# Patient Record
Sex: MALE | Race: BLACK OR AFRICAN AMERICAN | NOT HISPANIC OR LATINO | Employment: OTHER | URBAN - NONMETROPOLITAN AREA
[De-identification: names, ages, dates, MRNs, and addresses within clinical notes are randomized per-mention and may not be internally consistent; named-entity substitution may affect disease eponyms.]

---

## 2023-03-20 ENCOUNTER — TRANSCRIBE ORDERS (OUTPATIENT)
Dept: PHYSICAL THERAPY | Facility: HOSPITAL | Age: 53
End: 2023-03-20
Payer: MEDICARE

## 2023-03-20 DIAGNOSIS — Z98.1 ARTHRODESIS STATUS: Primary | ICD-10-CM

## 2023-03-22 ENCOUNTER — HOSPITAL ENCOUNTER (OUTPATIENT)
Dept: PHYSICAL THERAPY | Facility: HOSPITAL | Age: 53
Setting detail: THERAPIES SERIES
Discharge: HOME | End: 2023-03-22
Payer: MEDICARE

## 2023-03-22 DIAGNOSIS — M54.50 CHRONIC LOW BACK PAIN, UNSPECIFIED BACK PAIN LATERALITY, UNSPECIFIED WHETHER SCIATICA PRESENT: ICD-10-CM

## 2023-03-22 DIAGNOSIS — G89.29 CHRONIC LOW BACK PAIN, UNSPECIFIED BACK PAIN LATERALITY, UNSPECIFIED WHETHER SCIATICA PRESENT: ICD-10-CM

## 2023-03-22 DIAGNOSIS — Z98.1 ARTHRODESIS STATUS: Primary | ICD-10-CM

## 2023-03-22 PROCEDURE — 97162 PT EVAL MOD COMPLEX 30 MIN: CPT | Performed by: PHYSICAL THERAPIST

## 2023-03-22 NOTE — THERAPY EVALUATION
Outpatient Physical Therapy Ortho Initial Evaluation  [facility]     Patient Name: Sukumar Reina  : 1970  MRN: [id number]  Today's Date: 3/22/2023      Visit Date: 2023    Patient seen for 1 PT sessions.  Patient reports N/A% of improvement.  Next MD appt: NIKKI.  Recertification: 2023.    Therapy Diagnosis: S/P L5/S1 Fusion         Past Medical History:   Diagnosis Date   • Anxiety    • Arthritis    • Cancer (HCC)     Prostate, surgery, no chemo or radiation   • Depression    • Diabetes mellitus (HCC)    • Neuropathy         Past Surgical History:   Procedure Laterality Date   • BACK SURGERY     • HERNIA REPAIR     • JOINT REPLACEMENT         Visit Dx:     ICD-10-CM ICD-9-CM   1. Arthrodesis status  Z98.1 V45.4   2. Chronic low back pain, unspecified back pain laterality, unspecified whether sciatica present  M54.50 724.2    G89.29 338.29          Patient History     Row Name 23 0900             History    Chief Complaint Pain  -AJ      Type of Pain Back pain  -AJ      Date Current Problem(s) Began --  Chronicm, years  -AJ      Brief Description of Current Complaint Patient reprots he retired from the [employer] in  and that was his first back surgery. He reprots he has had 5 total back surgeries and is now fused in his entire lumbar spine. He reprots he also has a R TKA, L YOHANA and L TSA. He reprots he is set to have his other hip and knee replaced. He reprots all his doctors are in [place].  -AJ      Previous treatment for THIS PROBLEM Injections;Rehabilitation;Medication;Surgery  -AJ      Patient/Caregiver Goals Relieve pain;Improve mobility  -AJ      Current Tobacco Use None  -AJ      Smoking Status Non-smoking  -AJ      Patient's Rating of General Health Fair  -AJ      Occupation/sports/leisure activities Occupation: retired; Hobbies: fishing  -AJ      What clinical tests have you had for this problem? X-ray  -AJ         Pain     Pain Location Back  -AJ      Pain at  Present 7  -AJ      Pain at Best 4  -AJ      Pain at Worst 10  -AJ      Pain Frequency Constant/continuous  -AJ      Pain Description Sharp;Aching;Numbness  -AJ      What Performance Factors Make the Current Problem(s) WORSE? Prolonged positions, lifting  -AJ      What Performance Factors Make the Current Problem(s) BETTER? meds, change in position  -AJ      Is your sleep disturbed? Yes  -AJ      Is medication used to assist with sleep? Yes  -AJ      Total hours of sleep per night 2 hour segments  -AJ            User Key  (r) = Recorded By, (t) = Taken By, (c) = Cosigned By    Initials Name Provider Type    AJ Alina Kiran, PT DPT Physical Therapist                 PT Ortho     Row Name 03/22/23 0900       Subjective Comments    Subjective Comments see history  -AJ       Precautions and Contraindications    Precautions SPINAL FUSION  -    Contraindications NO BEND, TWIST, LIFT >10#  -AJ       Subjective Pain    Able to rate subjective pain? yes  -AJ    Pre-Treatment Pain Level 7  -AJ    Post-Treatment Pain Level 7  -AJ       Posture/Observations    Alignment Options Forward head;Thoracic kyphosis;Lumbar lordosis;Iliac crests  -AJ    Forward Head Mild;Moderate;Increased  -AJ    Thoracic Kyphosis Mild;Increased  -AJ    Lumbar lordosis Mild;Decreased  flattening  -    Iliac crests Bilateral:;Normal  pelvis appears level, does have true LLD which patient was fit with heel lift to assist with.  -AJ    Posture/Observations Comments No distress, poor overall postural awareness.  -AJ       Lumbar/SI Special Tests    Trendelenburg Test (Gluteus Medius Weakness) Bilateral:;Negative  -AJ    SLR (Neural Tension) Bilateral:;Positive  -AJ    AMBER (hip vs. SI Dysfunction) Bilateral:;Positive  -AJ       Lumbosacral Palpation    SI Bilateral:;Tender  -AJ    Lumbosacral Segment Bilateral:;Tender  -AJ       General ROM    GENERAL ROM COMMENTS AROM deferreed due to post op status  -AJ       MMT (Manual Muscle Testing)     General MMT Comments B LE 4-/5 with poor overall effort with cogwheeling.  -AJ       Sensation    Sensation WNL? WFL  -AJ    Light Touch No apparent deficits  -       Lower Extremity Flexibility    Hamstrings Bilateral:;Moderately limited  90-90 angles R:42°, L 46°  -AJ    LE Other Flexibility Bilateral:;Moderately limited  -AJ       Transfers    Comment, (Transfers) I with al ltransfers, absent log roll technique and significant effort with sit to/from stand.  -AJ       Gait/Stairs (Locomotion)    Comment, (Gait/Stairs) FWB, non-antalgic gait, no assistive device, no significant deviations noted, normal arm swing with gait.  -AJ          User Key  (r) = Recorded By, (t) = Taken By, (c) = Cosigned By    Initials Name Provider Type    Alina Beverly, PT DPT Physical Therapist                            Therapy Education  Education Details: HEP: all exercises given today  Given: HEP, Symptoms/condition management, Pain management, Posture/body mechanics, Other (comment) (POC)  Program: New  How Provided: Verbal, Demonstration, Written  Provided to: Patient  Level of Understanding: Verbalized, Demonstrated, Teach back education performed      PT OP Goals     Row Name 03/22/23 0900          PT Short Term Goals    STG 1 I with HEP and have additions/changes by next recertification  -     STG 2 Patient able to show proper log roll technique.  -     STG 3 Patient to be more aware of posture and posture correction techniques  -     STG 4 Patientot have improved hamstring flexibility with popliteal angles <= 30° in supine 90-90 position.  -     STG 5 R knee 0° extension.  -     STG 6 Patient to be complient with heel wedge wear for LLD.  -     STG 7 B LE >= 4/5.  -        Long Term Goals    LTG 1 B LE >= 4+/5  -     LTG 2 Patient able to perform 10 minutes of standing core stab activities with good PN and no increase in pain.  -     LTG 3 Patient able to perform 10 minutes of seated DD core stab  "activities with good PN and no increase in pain.  -     LTG 4 Patient able to perform 20 Bridges with UE \"X\" with no increase in pain.  -     LTG 5 Patient able to show proper lifting technique floor to waist with no increase in pain.  -     LTG 6 Patient able to show proper ergonomics for mopping/sweeping/vacuuming.  -     LTG 7 Patient able to complete 3 reps of 30 seconds wall sits with Pball behind back for improved LE and core endurance and strength.  -     LTG 8 I with final HEP.  -        Time Calculation    PT Goal Re-Cert Due Date 04/12/23  -           User Key  (r) = Recorded By, (t) = Taken By, (c) = Cosigned By    Initials Name Provider Type    Alina Beverly, PT DPT Physical Therapist              Barriers to Rehab: Include significant or possible arthritic/degenerative changes that have occurred within the spine, The chronicity of this issue, The patient's generally deconditioned state.    Safety Issues: No bending/twisting/lifting >10#       PT Assessment/Plan     Row Name 03/22/23 0900          PT Assessment    Functional Limitations Impaired gait;Limitation in home management;Limitations in community activities;Performance in leisure activities;Performance in self-care ADL  -     Impairments Endurance;Gait;Impaired flexibility;Impaired muscle endurance;Impaired muscle length;Impaired muscle power;Impaired postural alignment;Joint integrity;Muscle strength;Pain;Posture;Range of motion;Poor body mechanics  -     Assessment Comments Patientis a 52yo male who presents to the clinic today s/p L2/S1 fusion and a history of other lumbar fusions. he has restrictions of no bending, twisting, or lifting >10#. He has sgnificant core weakness, LE weakness, and impaired flexibility. Patient's insurance does not allow treatment to begin on the initial evaluation. Small HEP was established.    Skilled PT with address deficits listed.  -AJ     Please refer to paper survey for additional " "self-reported information Yes  -AJ     Rehab Potential Fair  -AJ     Patient/caregiver participated in establishment of treatment plan and goals Yes  -AJ     Patient would benefit from skilled therapy intervention Yes  -AJ        PT Plan    PT Frequency 2x/week  -AJ     Predicted Duration of Therapy Intervention (PT) 8-12 visits  -AJ     Planned CPT's? PT EVAL MOD COMPLELITY: 41610;PT RE-EVAL: 59346;PT THER PROC EA 15 MIN: 79723;PT THER ACT EA 15 MIN: 37415;PT NEUROMUSC RE-EDUCATION EA 15 MIN: 32475;PT GAIT TRAINING EA 15 MIN: 90449;PT SELF CARE/HOME MGMT/TRAIN EA 15: 43941;PT THER SUPP EA 15 MIN  -AJ     PT Plan Comments progress overall core stab, posture, flexibility, ergonomics, and spinal protection.  -AJ           User Key  (r) = Recorded By, (t) = Taken By, (c) = Cosigned By    Initials Name Provider Type    Alina Beverly, PT DPT Physical Therapist            Other therapeutic activities and/or exercises will be prescribed depending on the patient's progress or lack thereof.         OP Exercises     Row Name 03/22/23 0900             Subjective Comments    Subjective Comments see history  -AJ         Subjective Pain    Able to rate subjective pain? yes  -AJ      Pre-Treatment Pain Level 7  -AJ      Post-Treatment Pain Level 7  -AJ         Exercise 1    Exercise Name 1 FIt with heel lift for LLD  -AJ      Additional Comments Sz Medium  -AJ         Exercise 2    Exercise Name 2 B sitting piriformis S  -AJ      Reps 2 1  -AJ      Time 2 30 seconds  -AJ      Additional Comments oress knee down  -AJ         Exercise 3    Exercise Name 3 B St. HS S  -AJ      Reps 3 1  -AJ      Time 3 30 seconds  -AJ         Exercise 4    Exercise Name 4 HLM  -AJ      Sets 4 1  -AJ      Reps 4 10 each LE  -AJ      Time 4 5\" hold  -AJ            User Key  (r) = Recorded By, (t) = Taken By, (c) = Cosigned By    Initials Name Provider Type    Ailna Beverly, PT DPT Physical Therapist            All therapeutic " interventions performed today were to address current functional limitations and/or deficits in addressing all physical therapy goals.                    Outcome Measure Options: Modified Oswestry  Modified Oswestry  Modified Oswestry Score/Comments: 35/50 = 70%      Time Calculation:     Start Time: 0905  Stop Time: 0954  Time Calculation (min): 49 min     Therapy Charges for Today     Code Description Service Date Service Provider Modifiers Qty    74183882584 HC PT THER SUPP EA 15 MIN 3/22/2023 Alina Kiran, PT DPT GP 2    48272616753  PT EVAL MOD COMPLEXITY 3 3/22/2023 Alina Kiran, PT THERONT GP 1          PT G-Codes  Outcome Measure Options: Modified Oswestry  Modified Oswestry Score/Comments: 35/50 = 70%       This document has been electronically signed by Alina Kiran PT DPT, [facility] on March 22, 2023 16:14 CDT

## 2023-03-23 ENCOUNTER — APPOINTMENT (OUTPATIENT)
Dept: PHYSICAL THERAPY | Facility: HOSPITAL | Age: 53
End: 2023-03-23
Payer: MEDICARE

## 2023-03-28 ENCOUNTER — HOSPITAL ENCOUNTER (OUTPATIENT)
Dept: PHYSICAL THERAPY | Facility: HOSPITAL | Age: 53
Setting detail: THERAPIES SERIES
Discharge: HOME | End: 2023-03-28
Payer: MEDICARE

## 2023-03-28 DIAGNOSIS — M54.50 CHRONIC LOW BACK PAIN, UNSPECIFIED BACK PAIN LATERALITY, UNSPECIFIED WHETHER SCIATICA PRESENT: ICD-10-CM

## 2023-03-28 DIAGNOSIS — Z98.1 ARTHRODESIS STATUS: Primary | ICD-10-CM

## 2023-03-28 DIAGNOSIS — G89.29 CHRONIC LOW BACK PAIN, UNSPECIFIED BACK PAIN LATERALITY, UNSPECIFIED WHETHER SCIATICA PRESENT: ICD-10-CM

## 2023-03-28 PROCEDURE — 97110 THERAPEUTIC EXERCISES: CPT | Performed by: PHYSICAL THERAPIST

## 2023-03-28 NOTE — THERAPY TREATMENT NOTE
Outpatient Physical Therapy Ortho Treatment Note  [facility]     Patient Name: Sukumar Reina  : 1970  MRN: [id number]  Today's Date: 3/28/2023      Visit Date: 2023     Patient seen for 2 PT sessions.  Patient reports N/A% of improvement.  Next MD appt: NIKKI.  Recertification: 2023.     Therapy Diagnosis: S/P L5/S1 Fusion    Visit Dx:    ICD-10-CM ICD-9-CM   1. Arthrodesis status  Z98.1 V45.4   2. Chronic low back pain, unspecified back pain laterality, unspecified whether sciatica present  M54.50 724.2    G89.29 338.29       There is no problem list on file for this patient.       Past Medical History:   Diagnosis Date   • Anxiety    • Arthritis    • Cancer (HCC)     Prostate, surgery, no chemo or radiation   • Depression    • Diabetes mellitus ([facility])    • Neuropathy         Past Surgical History:   Procedure Laterality Date   • BACK SURGERY     • HERNIA REPAIR     • JOINT REPLACEMENT          PT Ortho     Row Name 23 0800       Subjective Comments    Subjective Comments Patient reports he didn't take his meds this morning because he didn't eat breakfast.  -       Precautions and Contraindications    Precautions SPINAL FUSION  -    Contraindications NO BEND, TWIST, LIFT >10#  -       Subjective Pain    Able to rate subjective pain? yes  -    Pre-Treatment Pain Level 8  -          User Key  (r) = Recorded By, (t) = Taken By, (c) = Cosigned By    Initials Name Provider Type    Alina Beverly, PT DPT Physical Therapist                             PT Assessment/Plan     Row Name 23 0800          PT Assessment    Assessment Comments Patient required a lot of cueing for posture and core stab with exercises today. No increas ein pain after treatment and no complaints of pain during treatment.  -        PT Plan    PT Frequency 2x/week  -     PT Plan Comments Add hip flexor stretch next session.  -           User Key  (r) = Recorded By, (t) = Taken By, (c)  "= Cosigned By    Initials Name Provider Type    Alina Beverly, PT DPT Physical Therapist                   OP Exercises     Row Name 03/28/23 0800             Subjective Comments    Subjective Comments Patient reports he didn't take his meds this morning because he didn't eat breakfast.  -AJ         Subjective Pain    Able to rate subjective pain? yes  -AJ      Pre-Treatment Pain Level 8  -AJ         Exercise 1    Exercise Name 1 Pro II LE- strength/posturing/endurance  -AJ      Time 1 10 min  -AJ      Additional Comments L 4.0  -AJ         Exercise 2    Exercise Name 2 B sitting piriformis S  -AJ      Reps 2 2  -AJ      Time 2 30 seconds  -AJ      Additional Comments press knee down  -AJ         Exercise 3    Exercise Name 3 HLM  -AJ      Sets 3 2  -AJ      Reps 3 10  -AJ      Time 3 5\" hold  -AJ         Exercise 4    Exercise Name 4 HL B hip add  -AJ      Sets 4 2  -AJ      Reps 4 10  -AJ      Time 4 5\" hold  -AJ         Exercise 5    Exercise Name 5 HL B hip abd  -AJ      Sets 5 2  -AJ      Reps 5 10  -AJ      Time 5 5\" hold  -AJ      Additional Comments GTB  -AJ         Exercise 6    Exercise Name 6 DKTC with Pball  -AJ      Sets 6 2  -AJ      Reps 6 10  -AJ      Time 6 5\" hold  -AJ         Exercise 7    Exercise Name 7 Bridges  -AJ      Sets 7 2  -AJ      Reps 7 10  -AJ      Time 7 3-5\" hold  -AJ         Exercise 8    Exercise Name 8 Prone B HS curl with add  -AJ      Sets 8 2  -AJ      Reps 8 10  -AJ      Time 8 5\" hold  -AJ         Exercise 9    Exercise Name 9 Prone heel squeezes  -AJ      Sets 9 1  -AJ      Reps 9 20  -AJ      Time 9 5\" hold  -AJ            User Key  (r) = Recorded By, (t) = Taken By, (c) = Cosigned By    Initials Name Provider Type    Alina Beverly, PT DPT Physical Therapist               All therapeutic interventions performed today were to address current functional limitations and/or deficits in addressing all physical therapy goals.                  PT OP Goals  " "   Row Name 03/28/23 0800          PT Short Term Goals    STG 1 I with HEP and have additions/changes by next recertification  -     STG 1 Progress Partially Met;Ongoing  -     STG 2 Patient able to show proper log roll technique.  -     STG 2 Progress Partially Met;Ongoing;Progressing  -     STG 3 Patient to be more aware of posture and posture correction techniques  -     STG 3 Progress Ongoing  -     STG 4 Patientot have improved hamstring flexibility with popliteal angles <= 30° in supine 90-90 position.  -     STG 5 R knee 0° extension.  -     STG 6 Patient to be complient with heel wedge wear for LLD.  -     STG 7 B LE >= 4/5.  -        Long Term Goals    LTG 1 B LE >= 4+/5  -     LTG 2 Patient able to perform 10 minutes of standing core stab activities with good PN and no increase in pain.  -     LTG 3 Patient able to perform 10 minutes of seated DD core stab activities with good PN and no increase in pain.  -     LTG 4 Patient able to perform 20 Bridges with UE \"X\" with no increase in pain.  -     LTG 5 Patient able to show proper lifting technique floor to waist with no increase in pain.  -     LTG 6 Patient able to show proper ergonomics for mopping/sweeping/vacuuming.  -     LTG 7 Patient able to complete 3 reps of 30 seconds wall sits with Pball behind back for improved LE and core endurance and strength.  -     LTG 8 I with final HEP.  -        Time Calculation    PT Goal Re-Cert Due Date 04/12/23  -           User Key  (r) = Recorded By, (t) = Taken By, (c) = Cosigned By    Initials Name Provider Type    Alina Beverly, PT DPT Physical Therapist                Therapy Education  Education Details: HEP: add bridges to HEP.  Given: HEP, Symptoms/condition management, Pain management, Posture/body mechanics  Program: New, Reinforced  How Provided: Verbal  Provided to: Patient  Level of Understanding: Teach back education performed, Verbalized, " Demonstrated              Time Calculation:   Start Time: 0805  Stop Time: 0858  Time Calculation (min): 53 min  Total Timed Code Minutes- PT: 53 minute(s)  Therapy Charges for Today     Code Description Service Date Service Provider Modifiers Qty    76569952235 HC PT THER SUPP EA 15 MIN 3/28/2023 Alina Kiran, PT DPT GP 1    29804553761 HC PT THER PROC EA 15 MIN 3/28/2023 Alina Kiran, PT DPT GP 4                  This document has been electronically signed by Alina Kiran PT DPT, [facility] on March 28, 2023 08:58 CDT

## 2023-03-30 ENCOUNTER — APPOINTMENT (OUTPATIENT)
Dept: PHYSICAL THERAPY | Facility: HOSPITAL | Age: 53
End: 2023-03-30
Payer: MEDICARE

## 2023-04-04 ENCOUNTER — HOSPITAL ENCOUNTER (OUTPATIENT)
Dept: PHYSICAL THERAPY | Facility: HOSPITAL | Age: 53
Setting detail: THERAPIES SERIES
Discharge: HOME | End: 2023-04-04
Payer: MEDICARE

## 2023-04-04 DIAGNOSIS — Z98.1 ARTHRODESIS STATUS: Primary | ICD-10-CM

## 2023-04-04 DIAGNOSIS — G89.29 CHRONIC LOW BACK PAIN, UNSPECIFIED BACK PAIN LATERALITY, UNSPECIFIED WHETHER SCIATICA PRESENT: ICD-10-CM

## 2023-04-04 DIAGNOSIS — M54.50 CHRONIC LOW BACK PAIN, UNSPECIFIED BACK PAIN LATERALITY, UNSPECIFIED WHETHER SCIATICA PRESENT: ICD-10-CM

## 2023-04-04 PROCEDURE — 97110 THERAPEUTIC EXERCISES: CPT

## 2023-04-04 NOTE — THERAPY TREATMENT NOTE
Outpatient Physical Therapy Ortho Treatment Note  [facility]     Patient Name: Sukumar Reina  : 1970  MRN: [id number]  Today's Date: 2023      Visit Date: 2023     Patient seen for 3 PT sessions.  Patient reports N/A% of improvement.  Next MD appt: NIKKI.  Recertification: 2023.     Therapy Diagnosis: S/P L5/S1 Fusion    Visit Dx:    ICD-10-CM ICD-9-CM   1. Arthrodesis status  Z98.1 V45.4   2. Chronic low back pain, unspecified back pain laterality, unspecified whether sciatica present  M54.50 724.2    G89.29 338.29       There is no problem list on file for this patient.       Past Medical History:   Diagnosis Date   • Anxiety    • Arthritis    • Cancer     Prostate, surgery, no chemo or radiation   • Depression    • Diabetes mellitus    • Neuropathy         Past Surgical History:   Procedure Laterality Date   • BACK SURGERY     • HERNIA REPAIR     • JOINT REPLACEMENT          PT Ortho     Row Name 23 08       Precautions and Contraindications    Precautions SPINAL FUSION  -    Contraindications NO BEND, TWIST, LIFT >10#  -          User Key  (r) = Recorded By, (t) = Taken By, (c) = Cosigned By    Initials Name Provider Type    Kate Ward PTA Physical Therapist Assistant                             PT Assessment/Plan     Row Name 23 08          PT Assessment    Assessment Comments patient requires cues for posturing and core stability. he requires cueing for log roll technique. patient is very stiff with movements.  -        PT Plan    PT Frequency 2x/week  -     PT Plan Comments resume bridges next visit. add step up with opp hip extension  -           User Key  (r) = Recorded By, (t) = Taken By, (c) = Cosigned By    Initials Name Provider Type    Kate Ward PTA Physical Therapist Assistant                   OP Exercises     Row Name 23 0823 07          Subjective Pain    Able to rate subjective pain? yes  - --  -      Pre-Treatment Pain Level 5  -MH --     Post-Treatment Pain Level 5  -MH --        Exercise 1    Exercise Name 1 Pro II LE- strength/posturing/endurance  - --     Time 1 10 min  - --     Additional Comments L 5.0  - --        Exercise 2    Exercise Name 2 B Sitting Piriformis S  -MH --     Reps 2 2  -MH --     Time 2 30 sec hold  - --        Exercise 3    Exercise Name 3 Gentle LTR  -MH --     Reps 3 10  -MH --     Time 3 10 sec hold  -MH --     Additional Comments small movements  - --        Exercise 4    Exercise Name 4 BKLL with Tband resistance  - --     Time 4 alt every 5 seconds for 3 minutes  - --     Additional Comments GTB  - --        Exercise 5    Exercise Name 5 Hooklying Tband Hip ABD  -MH --     Reps 5 20  -MH --     Time 5 5 sec hold  -MH --        Exercise 6    Exercise Name 6 Hooklying Hip ADD Squeeze  - --     Reps 6 20  -MH --     Time 6 5 sec hold  - --        Exercise 7    Exercise Name 7 DKTC Pball Rolls  - --     Reps 7 20  -MH --     Time 7 5 sec hold  -MH --        Exercise 8    Exercise Name 8 Supine Hip Flexor S  -MH --     Reps 8 2  -MH --     Time 8 1 minute  - --        Exercise 9    Exercise Name 9 Prone Heel Squeezes  - --     Reps 9 20  -MH --     Time 9 5 sec hold  - --        Exercise 10    Exercise Name 10 Prone Hip IR with Tband resist  - --     Reps 10 20  -MH --     Time 10 5 sec hold  - --     Additional Comments Yellow  - --           User Key  (r) = Recorded By, (t) = Taken By, (c) = Cosigned By    Initials Name Provider Type     Kate Espinosa PTA Physical Therapist Assistant                              PT OP Goals     Row Name 04/04/23 0800          PT Short Term Goals    STG 1 I with HEP and have additions/changes by next recertification  -     STG 1 Progress Partially Met;Ongoing  -     STG 2 Patient able to show proper log roll technique.  -     STG 2 Progress Partially Met;Ongoing;Progressing  -     STG 3 Patient to be more  "aware of posture and posture correction techniques  -     STG 3 Progress Ongoing  -     STG 4 Patientot have improved hamstring flexibility with popliteal angles <= 30° in supine 90-90 position.  -     STG 5 R knee 0° extension.  -     STG 6 Patient to be complient with heel wedge wear for LLD.  -     STG 7 B LE >= 4/5.  -        Long Term Goals    LTG 1 B LE >= 4+/5  -     LTG 2 Patient able to perform 10 minutes of standing core stab activities with good PN and no increase in pain.  -     LTG 3 Patient able to perform 10 minutes of seated DD core stab activities with good PN and no increase in pain.  -     LTG 4 Patient able to perform 20 Bridges with UE \"X\" with no increase in pain.  -     LTG 5 Patient able to show proper lifting technique floor to waist with no increase in pain.  -     LTG 6 Patient able to show proper ergonomics for mopping/sweeping/vacuuming.  -     LTG 7 Patient able to complete 3 reps of 30 seconds wall sits with Pball behind back for improved LE and core endurance and strength.  -     LTG 8 I with final HEP.  -        Time Calculation    PT Goal Re-Cert Due Date 04/12/23  -           User Key  (r) = Recorded By, (t) = Taken By, (c) = Cosigned By    Initials Name Provider Type     Kate Espinosa, PTA Physical Therapist Assistant                Therapy Education  Education Details: supine hip flexor S, prone hip IR with YTB  Given: HEP, Symptoms/condition management, Pain management, Posture/body mechanics  Program: Reinforced, New  How Provided: Verbal, Demonstration, Written  Provided to: Patient  Level of Understanding: Verbalized, Demonstrated, Teach back education performed              Time Calculation:   Start Time: 0830  Stop Time: 0936  Time Calculation (min): 66 min  Total Timed Code Minutes- PT: 66 minute(s)  Therapy Charges for Today     Code Description Service Date Service Provider Modifiers Qty    52076583051  PT THER SUPP EA 15 MIN 4/4/2023 " Ktae Espinosa PTA GP, CQ 1    30316039616 HC PT THER PROC EA 15 MIN 4/4/2023 Kate Espinosa PTA GP, CQ 4                    Kate Espinosa PTA  4/4/2023       This document has been electronically signed by Kate Espinosa PTA on April 4, 2023 09:41 CDT

## 2023-04-13 ENCOUNTER — APPOINTMENT (OUTPATIENT)
Dept: PHYSICAL THERAPY | Facility: HOSPITAL | Age: 53
End: 2023-04-13
Payer: MEDICARE

## 2023-04-18 ENCOUNTER — TELEPHONE (OUTPATIENT)
Dept: PHYSICAL THERAPY | Facility: HOSPITAL | Age: 53
End: 2023-04-18
Payer: MEDICARE

## 2023-04-18 DIAGNOSIS — G89.29 CHRONIC LOW BACK PAIN, UNSPECIFIED BACK PAIN LATERALITY, UNSPECIFIED WHETHER SCIATICA PRESENT: ICD-10-CM

## 2023-04-18 DIAGNOSIS — Z98.1 ARTHRODESIS STATUS: Primary | ICD-10-CM

## 2023-04-18 DIAGNOSIS — M54.50 CHRONIC LOW BACK PAIN, UNSPECIFIED BACK PAIN LATERALITY, UNSPECIFIED WHETHER SCIATICA PRESENT: ICD-10-CM

## 2023-04-18 NOTE — TELEPHONE ENCOUNTER
Called for no show appointment. Left message on voicemail to call office for more appointments as he has no more scheduled at this time.